# Patient Record
(demographics unavailable — no encounter records)

---

## 2025-04-17 NOTE — REVIEW OF SYSTEMS
[As Noted in HPI] : as noted in HPI [Dysuria] : no dysuria [Incontinence] : incontinence [Negative] : Heme/Lymph

## 2025-04-17 NOTE — ASSESSMENT
[FreeTextEntry1] : 87F with a long history of obstructive defecation likely to be paradox however on exam there is no further evidence of paradox advised to continue milk of mag which seems to allow a minimal-pain bowel movement may use suppositories but that causes pain with bowel movement no signs of obstruction will make further recommendations on rectocele repair once I am able to view MR-defo. will follow up all questions answered

## 2025-04-17 NOTE — HISTORY OF PRESENT ILLNESS
[FreeTextEntry1] : 87F who was briefly a patient of mine at [facility] though I never operated on her, presents for initial consultation at [facility]. The patient had a remote surgery from Dr Costello over 20 years ago. In the past 10 years, unrelated to her previous surgery, she developed obstructive defecation. She underwent a rectocele repair and a rectopexy by Dr Gonzalez at [facility]. Postoperatively she never did well. She has tremendous pelvic pain which is worse with bowel movements. She has to use laxatives daily and suppositories. She has seen multiple people and had botox to her anal sphincters and had pelvic PT. She continues to have issues though she is passing stool daily, mostly water, as she describes. Recently saw Zane Rea at [facility] who recommended a redo rectocele procedure. The patient is hesitant because her previous repair did not work and this is likely a symptom secondary to her obstructive defecation. She had an MRI which should a 1cm rectocele- I do not have the results or the images as there were issues logging into her [employer].

## 2025-04-17 NOTE — PHYSICAL EXAM
[JVD] : no jugular venous distention  [Respiratory Effort] : normal respiratory effort [No Rash or Lesion] : No rash or lesion [Alert] : alert [Oriented to Place] : oriented to place [Oriented to Person] : oriented to person [Oriented to Time] : oriented to time [Calm] : calm [de-identified] : abdomen soft, non distended [de-identified] : with chaperone present, placed in LLD. Inspection showed no infection/hemorrhoids, no evidence of paradox, weak resting tone, small rectocele, +urinary incontinence when valsalva [de-identified] : NAD [de-identified] : +EOMI [de-identified] : rectocele [de-identified] : full ROM

## 2025-07-07 NOTE — ASSESSMENT
[FreeTextEntry1] : 87F with pelvic flood dysfunction she has had multiple procedures in the past and still has pain, but with the help of OTC laxatives, is able to have bowel function I advised to continue to laxatives as I do not know if a colostomy would entirely resolve her pain & in her age group the operation comes with significant risks follow up in 6 months

## 2025-07-07 NOTE — HISTORY OF PRESENT ILLNESS
[FreeTextEntry1] : Update from last visit- Patient was referred to Dr Yeager at [facility] for pelvic floor disorder She tried a SNM As per patient, she felt no difference so the wires were removed She continues to have difficulty with bowel function Requires OTC laxatives, yet does have some bowel function despite the pain

## 2025-07-07 NOTE — REASON FOR VISIT
[Follow-Up] : a follow-up visit [FreeTextEntry1] : This was a phone visit as request of the patient. She was in [facility] and I was in my [facility] office at [address]